# Patient Record
Sex: FEMALE | URBAN - METROPOLITAN AREA
[De-identification: names, ages, dates, MRNs, and addresses within clinical notes are randomized per-mention and may not be internally consistent; named-entity substitution may affect disease eponyms.]

---

## 2022-06-15 ENCOUNTER — EMERGENCY (EMERGENCY)
Facility: HOSPITAL | Age: 47
LOS: 0 days | Discharge: HOME | End: 2022-06-15
Attending: STUDENT IN AN ORGANIZED HEALTH CARE EDUCATION/TRAINING PROGRAM | Admitting: STUDENT IN AN ORGANIZED HEALTH CARE EDUCATION/TRAINING PROGRAM
Payer: COMMERCIAL

## 2022-06-15 VITALS — OXYGEN SATURATION: 99 % | HEART RATE: 84 BPM

## 2022-06-15 VITALS
DIASTOLIC BLOOD PRESSURE: 101 MMHG | HEART RATE: 105 BPM | WEIGHT: 160.06 LBS | OXYGEN SATURATION: 100 % | SYSTOLIC BLOOD PRESSURE: 191 MMHG | TEMPERATURE: 97 F | RESPIRATION RATE: 20 BRPM

## 2022-06-15 DIAGNOSIS — M79.602 PAIN IN LEFT ARM: ICD-10-CM
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
## 2022-06-15 DIAGNOSIS — R00.0 TACHYCARDIA, UNSPECIFIED: ICD-10-CM
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
## 2022-06-15 DIAGNOSIS — S61.452A OPEN BITE OF LEFT HAND, INITIAL ENCOUNTER: ICD-10-CM

## 2022-06-15 DIAGNOSIS — R00.2 PALPITATIONS: ICD-10-CM

## 2022-06-15 LAB
ALBUMIN SERPL ELPH-MCNC: 4.8 G/DL — SIGNIFICANT CHANGE UP (ref 3.5–5.2)
ALP SERPL-CCNC: 64 U/L — SIGNIFICANT CHANGE UP (ref 30–115)
ALT FLD-CCNC: 14 U/L — SIGNIFICANT CHANGE UP (ref 0–41)
ANION GAP SERPL CALC-SCNC: 14 MMOL/L — SIGNIFICANT CHANGE UP (ref 7–14)
AST SERPL-CCNC: 16 U/L — SIGNIFICANT CHANGE UP (ref 0–41)
BASOPHILS # BLD AUTO: 0.02 K/UL — SIGNIFICANT CHANGE UP (ref 0–0.2)
BASOPHILS NFR BLD AUTO: 0.2 % — SIGNIFICANT CHANGE UP (ref 0–1)
BILIRUB SERPL-MCNC: 0.3 MG/DL — SIGNIFICANT CHANGE UP (ref 0.2–1.2)
BUN SERPL-MCNC: 14 MG/DL — SIGNIFICANT CHANGE UP (ref 10–20)
CALCIUM SERPL-MCNC: 10 MG/DL — SIGNIFICANT CHANGE UP (ref 8.5–10.1)
CHLORIDE SERPL-SCNC: 102 MMOL/L — SIGNIFICANT CHANGE UP (ref 98–110)
CO2 SERPL-SCNC: 23 MMOL/L — SIGNIFICANT CHANGE UP (ref 17–32)
CREAT SERPL-MCNC: 0.8 MG/DL — SIGNIFICANT CHANGE UP (ref 0.7–1.5)
EGFR: 92 ML/MIN/1.73M2 — SIGNIFICANT CHANGE UP
EOSINOPHIL # BLD AUTO: 0.02 K/UL — SIGNIFICANT CHANGE UP (ref 0–0.7)
EOSINOPHIL NFR BLD AUTO: 0.2 % — SIGNIFICANT CHANGE UP (ref 0–8)
GLUCOSE SERPL-MCNC: 93 MG/DL — SIGNIFICANT CHANGE UP (ref 70–99)
HCG SERPL QL: NEGATIVE — SIGNIFICANT CHANGE UP
HCT VFR BLD CALC: 41.9 % — SIGNIFICANT CHANGE UP (ref 37–47)
HGB BLD-MCNC: 15 G/DL — SIGNIFICANT CHANGE UP (ref 12–16)
IMM GRANULOCYTES NFR BLD AUTO: 0.3 % — SIGNIFICANT CHANGE UP (ref 0.1–0.3)
LYMPHOCYTES # BLD AUTO: 1.9 K/UL — SIGNIFICANT CHANGE UP (ref 1.2–3.4)
LYMPHOCYTES # BLD AUTO: 16.6 % — LOW (ref 20.5–51.1)
MAGNESIUM SERPL-MCNC: 2.3 MG/DL — SIGNIFICANT CHANGE UP (ref 1.8–2.4)
MCHC RBC-ENTMCNC: 32.2 PG — HIGH (ref 27–31)
MCHC RBC-ENTMCNC: 35.8 G/DL — SIGNIFICANT CHANGE UP (ref 32–37)
MCV RBC AUTO: 89.9 FL — SIGNIFICANT CHANGE UP (ref 81–99)
MONOCYTES # BLD AUTO: 0.48 K/UL — SIGNIFICANT CHANGE UP (ref 0.1–0.6)
MONOCYTES NFR BLD AUTO: 4.2 % — SIGNIFICANT CHANGE UP (ref 1.7–9.3)
NEUTROPHILS # BLD AUTO: 9 K/UL — HIGH (ref 1.4–6.5)
NEUTROPHILS NFR BLD AUTO: 78.5 % — HIGH (ref 42.2–75.2)
NRBC # BLD: 0 /100 WBCS — SIGNIFICANT CHANGE UP (ref 0–0)
NT-PROBNP SERPL-SCNC: 43 PG/ML — SIGNIFICANT CHANGE UP (ref 0–300)
PLATELET # BLD AUTO: 296 K/UL — SIGNIFICANT CHANGE UP (ref 130–400)
POTASSIUM SERPL-MCNC: 4.2 MMOL/L — SIGNIFICANT CHANGE UP (ref 3.5–5)
POTASSIUM SERPL-SCNC: 4.2 MMOL/L — SIGNIFICANT CHANGE UP (ref 3.5–5)
PROT SERPL-MCNC: 7.7 G/DL — SIGNIFICANT CHANGE UP (ref 6–8)
RBC # BLD: 4.66 M/UL — SIGNIFICANT CHANGE UP (ref 4.2–5.4)
RBC # FLD: 12.1 % — SIGNIFICANT CHANGE UP (ref 11.5–14.5)
SODIUM SERPL-SCNC: 139 MMOL/L — SIGNIFICANT CHANGE UP (ref 135–146)
TROPONIN T SERPL-MCNC: <0.01 NG/ML — SIGNIFICANT CHANGE UP
WBC # BLD: 11.46 K/UL — HIGH (ref 4.8–10.8)
WBC # FLD AUTO: 11.46 K/UL — HIGH (ref 4.8–10.8)

## 2022-06-15 PROCEDURE — 99285 EMERGENCY DEPT VISIT HI MDM: CPT

## 2022-06-15 PROCEDURE — 93010 ELECTROCARDIOGRAM REPORT: CPT

## 2022-06-15 PROCEDURE — 93971 EXTREMITY STUDY: CPT | Mod: 26,LT

## 2022-06-15 PROCEDURE — 71046 X-RAY EXAM CHEST 2 VIEWS: CPT | Mod: 26

## 2022-06-15 RX ORDER — ACETAMINOPHEN 500 MG
975 TABLET ORAL ONCE
Refills: 0 | Status: COMPLETED | OUTPATIENT
Start: 2022-06-15 | End: 2022-06-15

## 2022-06-15 RX ADMIN — Medication 975 MILLIGRAM(S): at 14:57

## 2022-06-15 NOTE — ED PROVIDER NOTE - CLINICAL SUMMARY MEDICAL DECISION MAKING FREE TEXT BOX
46-year-old female with history as documented presenting with palpitations as well as weird sensation to the left upper extremity.  Denies any chest pain or shortness of breath.  EKG is sinus tachycardia.  Labs reviewed without significant abnormality.  Imaging reviewed without PE or focal pneumonia on imaging.  Patient initially noted to have oral temp of 99.7, on Augmentin and prednisone for her wound.  Heart rate improved with fluids and Tylenol.  Patient asymptomatic at this time.  Encouraged to follow-up with cardiology for possible Holter monitor.  Return precaution discussed in detail with patient as well as development of erythema and/or significant swelling to the left upper extremity, persistent tachycardia and or palpitations.  Syncope and/or development of chest pain/shortness of breath.    Patient lives in [place], will follow up with physicians there.

## 2022-06-15 NOTE — ED PROVIDER NOTE - ATTENDING APP SHARED VISIT CONTRIBUTION OF CARE
46-year-old female no past medical history presenting with palpitations.  Patient states that 2 days ago she was bit by a nonvenomous snake on her left hand, initially had some swelling and discoloration to the left hand.  Was seen at the urgent care center yesterday and discharged on Augmentin.  Patient denies any overt fevers.  States that today while driving she started to have palpitations and said that her heart rate was going in the 130s, denies any chest pain or shortness of breath.  Patient states that she had this "sluggish "feeling to the left forearm that spread to the left upper arm.  Denies any other neurological deficits.  States that she has had palpitations in the past, only has episodes syncope when drawing blood however has never had any syncope in the setting of palpitations.  Denies any family history of PE/DVT and or arrhythmia.    CONSTITUTIONAL: Well-developed; well-nourished; in no acute distress.   SKIN: warm, dry  HEAD: Normocephalic; atraumatic.  EYES: PERRL, EOMI, no conjunctival erythema  ENT: No nasal discharge; airway clear.  NECK: Supple; non tender.  CARD: S1, S2 normal; no murmurs, gallops, or rubs. tachycardic.   RESP: No wheezes, rales or rhonchi.  ABD: soft ntnd.  EXT: Normal ROM. mild edema to the left hand, no significant warmth to the Left hand. Neurovascularly intact left hand.   NEURO: Alert, oriented, grossly unremarkable. mild difference in sensation to the LUE- however, sensation is intact- NIHSS 0.   PSYCH: Cooperative, appropriate.    EKG: sinus tachycardia  unlikely PE as no chest pain or sob  r/o electrolyte abnormalities  plan for labs, imaging, fluids, tylenol (oral temp 99.7 F by PCA- however was not documented).  reassess. 46-year-old female no past medical history presenting with palpitations.  Patient states that 2 days ago she was bit by a nonvenomous snake on her left hand, initially had some swelling and discoloration to the left hand.  Was seen at the urgent care center yesterday and discharged on Augmentin and prednisone.  Patient denies any overt fevers.  States that today while driving she started to have palpitations and said that her heart rate was going in the 130s, denies any chest pain or shortness of breath.  Patient states that she had this "sluggish "feeling to the left forearm that spread to the left upper arm.  Denies any other neurological deficits.  States that she has had palpitations in the past, only has episodes syncope when drawing blood however has never had any syncope in the setting of palpitations.  Denies any family history of PE/DVT and or arrhythmia.    CONSTITUTIONAL: Well-developed; well-nourished; in no acute distress.   SKIN: warm, dry  HEAD: Normocephalic; atraumatic.  EYES: PERRL, EOMI, no conjunctival erythema  ENT: No nasal discharge; airway clear.  NECK: Supple; non tender.  CARD: S1, S2 normal; no murmurs, gallops, or rubs. tachycardic.   RESP: No wheezes, rales or rhonchi.  ABD: soft ntnd.  EXT: Normal ROM. mild edema to the left hand, no significant warmth to the Left hand. Neurovascularly intact left hand.   NEURO: Alert, oriented, grossly unremarkable. mild difference in sensation to the LUE- however, sensation is intact- NIHSS 0.   PSYCH: Cooperative, appropriate.    EKG: sinus tachycardia  unlikely PE as no chest pain or sob  r/o electrolyte abnormalities  plan for labs, imaging, fluids, tylenol (oral temp 99.7 F by PCA- however was not documented).  reassess.

## 2022-06-15 NOTE — ED PROVIDER NOTE - OBJECTIVE STATEMENT
Patient is a 46-year-old female with no significant past medical history here for evaluation of left arm discomfort status post snakebite 2 days ago by nonvenomous snake.  Patient also notes tachycardia upon driving to work this morning, stating her heart rate was in the 130s which is unusual for her.  Patient denies chest pain, shortness of breath,  Fever.

## 2022-06-15 NOTE — ED PROVIDER NOTE - CARE PROVIDER_API CALL
Red Reed (MD)  Cardiovascular Disease; Interventional Cardiology  [address]  [place] [address]  Phone: [phone]  Fax: [phone]  Follow Up Time: Routine

## 2022-06-15 NOTE — ED PROVIDER NOTE - NS ED ATTENDING STATEMENT MOD
This was a shared visit with the JOHNIE. I reviewed and verified the documentation and independently performed the documented:

## 2022-06-15 NOTE — ED PROVIDER NOTE - PHYSICAL EXAMINATION
VITAL SIGNS: I have reviewed nursing notes and confirm.  CONSTITUTIONAL: Well-developed; well-nourished; in no acute distress.   SKIN: skin exam is warm and dry, no acute rash.    HEAD: Normocephalic; atraumatic.  EYES:  conjunctiva and sclera clear.  ENT: No nasal discharge; airway clear.  CARD: S1, S2 normal; no murmurs, gallops, or rubs. Regular rate and rhythm.   RESP: No wheezes, rales or rhonchi.  ABD: Normal bowel sounds; soft; non-distended; non-tender  EXT: radial pulses present, sensation intact, no redness/fluctuance Normal ROM.  No clubbing, cyanosis or edema.   LYMPH: No acute cervical adenopathy.  NEURO: Alert, oriented, grossly unremarkable  PSYCH: Cooperative, appropriate.

## 2022-06-15 NOTE — ED ADULT TRIAGE NOTE - CHIEF COMPLAINT QUOTE
patient was bit  by garter snake on monday. patient was placed on amox - today hand is swollen and bruised patient noted to be tachycardic in triage

## 2022-06-15 NOTE — ED PROVIDER NOTE - ST/T WAVE
FAMILY HISTORY:  Mother  Still living? Unknown  FH: HIV infection, Age at diagnosis: Age Unknown    
no stemi

## 2022-06-15 NOTE — ED ADULT NURSE NOTE - OBJECTIVE STATEMENT
patient c/o hand pain and swelling s/p snake bite. patient on po abx. denies fevers patient c/o left hand pain and swelling s/p snake bite. patient on po abx. denies fevers

## 2022-06-15 NOTE — ED PROVIDER NOTE - PATIENT PORTAL LINK FT
You can access the FollowMyHealth Patient Portal offered by [facility] by registering at the following website: [email or web address]. By joining [employer]’s FollowMyHealth portal, you will also be able to view your health information using other applications (apps) compatible with our system.
